# Patient Record
Sex: MALE | Race: WHITE | Employment: FULL TIME | URBAN - METROPOLITAN AREA
[De-identification: names, ages, dates, MRNs, and addresses within clinical notes are randomized per-mention and may not be internally consistent; named-entity substitution may affect disease eponyms.]

---

## 2017-12-27 ENCOUNTER — TRANSFERRED RECORDS (OUTPATIENT)
Dept: HEALTH INFORMATION MANAGEMENT | Facility: CLINIC | Age: 52
End: 2017-12-27

## 2018-01-08 ENCOUNTER — TRANSFERRED RECORDS (OUTPATIENT)
Dept: HEALTH INFORMATION MANAGEMENT | Facility: CLINIC | Age: 53
End: 2018-01-08

## 2018-01-30 ENCOUNTER — PRE VISIT (OUTPATIENT)
Dept: UROLOGY | Facility: CLINIC | Age: 53
End: 2018-01-30

## 2018-01-30 NOTE — TELEPHONE ENCOUNTER
PREVISIT INFORMATION                                                    Gildardo Biswaspippa scheduled for future visit at [facility] specialty clinics.    Patient is scheduled to see Dr. Arora on 2/5/18  Reason for visit: Thomas   Referring provider: Valeria  Has patient seen previous specialist? Yes.  Name of provider Dr. Shaw, [facility].   Medical Records:  Requested by CANELO Dunn on 1/30/18.    REVIEW                                                      New patient packet mailed to patient: Yes  Medication reconciliation complete: No      PLAN/FOLLOW-UP NEEDED                                                      Patient Reminders Given:    Informed patient to bring an updated list of allergies, medications, pharmacy details and insurance information. Directed patient to come to the 2nd floor, check-in D for their appointment. Informed patient to call back if appointment needs to be cancelled or rescheduled at [phone].    Reminded patient to bring any outside records regarding this appointment or have them faxed to clinic at [phone].    Reminded patient to complete and bring in urology questionnaire. Also for patient to please come with a full bladder and to ask , if early to get staff member for sample.

## 2018-02-05 ENCOUNTER — OFFICE VISIT (OUTPATIENT)
Dept: UROLOGY | Facility: CLINIC | Age: 53
End: 2018-02-05
Payer: COMMERCIAL

## 2018-02-05 VITALS
HEIGHT: 72 IN | HEART RATE: 81 BPM | BODY MASS INDEX: 36.84 KG/M2 | DIASTOLIC BLOOD PRESSURE: 87 MMHG | WEIGHT: 272 LBS | OXYGEN SATURATION: 94 % | SYSTOLIC BLOOD PRESSURE: 129 MMHG

## 2018-02-05 DIAGNOSIS — N52.1 ERECTILE DYSFUNCTION DUE TO DISEASES CLASSIFIED ELSEWHERE: Primary | ICD-10-CM

## 2018-02-05 DIAGNOSIS — N48.6 PEYRONIE'S DISEASE: ICD-10-CM

## 2018-02-05 PROCEDURE — 99203 OFFICE O/P NEW LOW 30 MIN: CPT | Performed by: UROLOGY

## 2018-02-05 RX ORDER — LISINOPRIL AND HYDROCHLOROTHIAZIDE 12.5; 2 MG/1; MG/1
1 TABLET ORAL
COMMUNITY

## 2018-02-05 RX ORDER — LORAZEPAM 0.5 MG/1
TABLET ORAL PRN
Refills: 1 | COMMUNITY
Start: 2018-01-08

## 2018-02-05 RX ORDER — ATORVASTATIN CALCIUM 10 MG/1
TABLET, FILM COATED ORAL
Refills: 0 | COMMUNITY
Start: 2018-01-09

## 2018-02-05 RX ORDER — CLONAZEPAM 0.5 MG/1
TABLET ORAL
Refills: 0 | COMMUNITY
Start: 2017-10-03

## 2018-02-05 RX ORDER — CLONAZEPAM 0.5 MG/1
0.5 TABLET ORAL
COMMUNITY

## 2018-02-05 RX ORDER — NEFAZODONE HYDROCHLORIDE 200 MG/1
100 TABLET ORAL
COMMUNITY

## 2018-02-05 RX ORDER — DOXAZOSIN 1 MG/1
TABLET ORAL
Refills: 0 | COMMUNITY
Start: 2018-01-08

## 2018-02-05 ASSESSMENT — PAIN SCALES - GENERAL: PAINLEVEL: NO PAIN (0)

## 2018-02-05 NOTE — NURSING NOTE
Gildardo Blankenship's goals for this visit include:   He requests these members of his care team be copied on today's visit information: PCP    PCP: Tristan Saha    Referring Provider:  Tristan Saha MD  [address] [address]    Chief Complaint   Patient presents with     Consult     Peryronie disease       Initial /87 (BP Location: Left arm, Patient Position: Sitting, Cuff Size: Adult Large)  Pulse 81  Ht 1.829 m (6')  Wt 123.4 kg (272 lb)  SpO2 94%  BMI 36.89 kg/m2 Estimated body mass index is 36.89 kg/(m^2) as calculated from the following:    Height as of this encounter: 1.829 m (6').    Weight as of this encounter: 123.4 kg (272 lb).  Medication Reconciliation: complete    Do you need any medication refills at today's visit? Jena Riojas LPN

## 2018-02-05 NOTE — MR AVS SNAPSHOT
After Visit Summary   2018    Gildardo Blankenship    MRN: [id number]           Patient Information     Date Of Birth          1965        Visit Information        Provider Department      2018 1:30 PM Gurdeep Arora MD [facility]        Today's Diagnoses     Erectile dysfunction due to diseases classified elsewhere    -  1    Peyronie's disease           Follow-ups after your visit        Follow-up notes from your care team     Return if symptoms worsen or fail to improve.      Who to contact     If you have questions or need follow up information about today's clinic visit or your schedule please contact [facility] directly at [phone].  Normal or non-critical lab and imaging results will be communicated to you by MyChart, letter or phone within 4 business days after the clinic has received the results. If you do not hear from us within 7 days, please contact the clinic through MyChart or phone. If you have a critical or abnormal lab result, we will notify you by phone as soon as possible.  Submit refill requests through zePASS or call your pharmacy and they will forward the refill request to us. Please allow 3 business days for your refill to be completed.          Additional Information About Your Visit        MyChart Information     zePASS is an electronic gateway that provides easy, online access to your medical records. With zePASS, you can request a clinic appointment, read your test results, renew a prescription or communicate with your care team.     To sign up for zePASS visit the website at [email or web address]   You will be asked to enter the access code listed below, as well as some personal information. Please follow the directions to create your username and password.     Your access code is: [email or web address]  Expires: 2018 12:57 AM     Your access code will  in 90 days. If you need help or a new code, please  contact your [facility] or call [phone] for assistance.        Care EveryWhere ID     This is your Care EveryWhere ID. This could be used by other organizations to access your [facility] medical records  [id number]        Your Vitals Were     Pulse Height Pulse Oximetry BMI (Body Mass Index)          81 1.829 m (6') 94% 36.89 kg/m2         Blood Pressure from Last 3 Encounters:   02/05/18 129/87    Weight from Last 3 Encounters:   02/05/18 123.4 kg (272 lb)              Today, you had the following     No orders found for display       Primary Care Provider Office Phone # Fax #    Tristan Saha [phone]       [address] [address]        Equal Access to Services     AMIRAH FREDERICK : Hadii alpesh berry hadasho Soomaali, waaxda luqadaha, qaybta kaalmada adeegyada, waxay idiin hayaan laci sewell . So [facility] [phone].    ATENCIÓN: Si habla español, tiene a hayes disposición servicios gratuitos de asistencia lingüística. Llame al [phone].    We comply with applicable federal civil rights laws and [place] laws. We do not discriminate on the basis of race, color, national origin, age, disability, sex, sexual orientation, or gender identity.            Thank you!     Thank you for choosing [facility]  for your care. Our goal is always to provide you with excellent care. Hearing back from our patients is one way we can continue to improve our services. Please take a few minutes to complete the written survey that you may receive in the mail after your visit with us. Thank you!             Your Updated Medication List - Protect others around you: Learn how to safely use, store and throw away your medicines at www.disposemymeds.org.          This list is accurate as of 2/5/18 11:59 PM.  Always use your most recent med list.                   Brand Name Dispense Instructions for use Diagnosis    atorvastatin 10 MG  tablet    LIPITOR     TK 1 T PO QD        * clonazePAM 0.5 MG tablet    klonoPIN     Take 0.5 mg by mouth        * clonazePAM 0.5 MG tablet    klonoPIN     TAKE 1 TABLET BY MOUTH TWICE A DAY AS NEEDED        doxazosin 1 MG tablet    CARDURA     TK 1  T PO QD        lisinopril-hydrochlorothiazide 20-12.5 MG per tablet    PRINZIDE/ZESTORETIC     Take 1 tablet by mouth        LORazepam 0.5 MG tablet    ATIVAN     as needed        nefazodone 200 MG tablet    SERZONE     Take 100 mg by mouth        omeprazole 20 MG CR capsule    priLOSEC     TK 1 C PO QD        * Notice:  This list has 2 medication(s) that are the same as other medications prescribed for you. Read the directions carefully, and ask your doctor or other care provider to review them with you.

## 2018-02-05 NOTE — LETTER
2/5/2018      RE: Gildardo Blankenship  [address]  [facility] [address]       I am seeing Gildardo Blankenship in consultation from Dr. Shaw for evaluation of Peyronie's disease and erectile dysfunction.    HPI:  Gildardo Blankenship is a 52 year old male with erectile dysfunction and Peyronie's disease.  He has a 90 degree dorsal curvature.  He noted this after an injury during intercourse about 2 years ago.  He felt a small pop, had a small area of dorsal bruising, and this turned into a 90  curve, eventually.    He has some difficulty with the girth and firmness of erections, he has a tri-mix on hand, but does not use this because it hurts, due to the stiffness against the curve.  He cannot really do insertive intercourse because of the curve.  He has noticed some loss of length, girth.  He's been followed by Dr. Shaw recently.    Retinitis pigmentosa so cannot take PDE5i.    REVIEW OF SYSTEMS:  General: negative  Skin: negative  Eyes: negative  Ears/Nose/Throat: negative  Respiratory: negative  Cardiovascular: negative  Gastrointestinal: negative  Genitourinary: see HPI  Musculoskeletal: negative  Neurologic: negative  Psychiatric: negative  Hematologic/Lymphatic/Immunologic: negative  Endocrine: negative    PAST MEDICAL HX:  Hypogonadism on testosterone replacement therapy  Retinitis pigmentosa  Kidney stones  Recovering alcoholic  Quit smoking  Fracture neck, back, arms ( former snocross racer).  Weight gain  Arthritis  No coronary artery disease  HLD  HTN  GERD  Kidney stone     PAST SURG HX:  Ortho surgeries  Kidney stone x 3.      FAMILY HX:  No family history on file.    SOCIAL HX:  Social History   Substance Use Topics     Smoking status: Former Smoker     Years: 29.00     Types: Cigarettes     Quit date: 6/15/2015     Smokeless tobacco: Never Used     Alcohol use Not on file       MEDICATIONS:  Current Outpatient Prescriptions   Medication Sig     clonazePAM (KLONOPIN) 0.5 MG tablet Take 0.5  mg by mouth     lisinopril-hydrochlorothiazide (PRINZIDE/ZESTORETIC) 20-12.5 MG per tablet Take 1 tablet by mouth     doxazosin (CARDURA) 1 MG tablet TK 1  T PO QD     atorvastatin (LIPITOR) 10 MG tablet TK 1 T PO QD     nefazodone (SERZONE) 200 MG tablet Take 100 mg by mouth     omeprazole (PRILOSEC) 20 MG CR capsule TK 1 C PO QD     clonazePAM (KLONOPIN) 0.5 MG tablet TAKE 1 TABLET BY MOUTH TWICE A DAY AS NEEDED     LORazepam (ATIVAN) 0.5 MG tablet as needed     No current facility-administered medications for this visit.        ALLERGIES:  Review of patient's allergies indicates no known allergies.      GENERAL PHYSICAL EXAM:     /87 (BP Location: Left arm, Patient Position: Sitting, Cuff Size: Adult Large)  Pulse 81  Ht 1.829 m (6')  Wt 123.4 kg (272 lb)  SpO2 94%  BMI 36.89 kg/m2   Constitutional: No acute distress. Well nourished.   PSYCH: normal mood and affect.  NEURO: normal gait, no focal deficits.   EYES: anicteric, EOMI, PERR.  CARDIOPULMONARY: breathing non-labored, pulse regular rate/rhythm, no peripheral edema.  GI: Abdomen soft, overweight.  MUSCULOSKELETAL: normal limb proportions, no muscle wasting, no contractures.  SKIN: Normal virilized hair distribution, no lesions, warts or rashes over genitalia, abdomen extremities or face.  HEME/LYMPH: no ecchymosis, no lymphadenopathy in groin, no lymphedema.     EXAM:  Phallus circumcised, meatus adequate, diffuse plaques palpated, especially distal bilateral corpora.  Left testis descended , size is normal  , consistency is normal . No intra-testicular masses.   Right testis descended , size is normal  , consistency is normal . No intra-testicular masses.   Epididymes present, non-tender, not-enlarged.   Cord structures not remarkable.     Imaging/labs:  None    ASSESSMENT:     Peyronie's disease with severe curve, and loss of girth, length.    Erectile dysfunction- firmness is not what it was but is probably adequate for intercourse if penis  was straight.    Hypogonadism, taking testosterone.    PLAN:  Orders Placed This Encounter     clonazePAM (KLONOPIN) 0.5 MG tablet     lisinopril-hydrochlorothiazide (PRINZIDE/ZESTORETIC) 20-12.5 MG per tablet     doxazosin (CARDURA) 1 MG tablet     atorvastatin (LIPITOR) 10 MG tablet     nefazodone (SERZONE) 200 MG tablet     omeprazole (PRILOSEC) 20 MG CR capsule     clonazePAM (KLONOPIN) 0.5 MG tablet     LORazepam (ATIVAN) 0.5 MG tablet         The risks of plaque excision and grafting were discussed (numbness, continued curvature, de shelton erectile dysfunction, pain, infection, possible plication also required).  Main benefit is possible restored length and perhaps girth.  Discussed that still it would be shorter than before the injury.     The risks of plication surgery were explained. I stressed to him that shortening can occur, but we can make him functionally straight rather than perfectly straight to avoid excessive shortening.  Discussed that de shelton ED is about 5% risk with plication.  Other risks include recurrence of curvature, pain from sutures, palpable sutures.     Overall, I would lean towards initially just a plaque excision and grafting for him.  It is not clear that he would need a penile prosthesis, and he and I would like to avoid this if possible.  We discussed that this is how I would proceed for him.  I encouraged him to get a second opinions and think about what he'd like to do.  Discussed PEG carries more risk of ED than plication, but might help restore some degree of length he's lost.      Copied cc to Consulting provider Valeria    Thank-you for the kind consultation.  Gurdeep Arora MD     Urological Surgeon    Gurdeep Arora MD

## 2018-02-05 NOTE — PROGRESS NOTES
I am seeing Gildardo Blankenship in consultation from Dr. Shaw for evaluation of Peyronie's disease and erectile dysfunction.    HPI:  Gildardo Blankenship is a 52 year old male with erectile dysfunction and Peyronie's disease.  He has a 90 degree dorsal curvature.  He noted this after an injury during intercourse about 2 years ago.  He felt a small pop, had a small area of dorsal bruising, and this turned into a 90  curve, eventually.    He has some difficulty with the girth and firmness of erections, he has a tri-mix on hand, but does not use this because it hurts, due to the stiffness against the curve.  He cannot really do insertive intercourse because of the curve.  He has noticed some loss of length, girth.  He's been followed by Dr. Shaw recently.    Retinitis pigmentosa so cannot take PDE5i.    REVIEW OF SYSTEMS:  General: negative  Skin: negative  Eyes: negative  Ears/Nose/Throat: negative  Respiratory: negative  Cardiovascular: negative  Gastrointestinal: negative  Genitourinary: see HPI  Musculoskeletal: negative  Neurologic: negative  Psychiatric: negative  Hematologic/Lymphatic/Immunologic: negative  Endocrine: negative    PAST MEDICAL HX:  Hypogonadism on testosterone replacement therapy  Retinitis pigmentosa  Kidney stones  Recovering alcoholic  Quit smoking  Fracture neck, back, arms ( former snocross racer).  Weight gain  Arthritis  No coronary artery disease  HLD  HTN  GERD  Kidney stone     PAST SURG HX:  Ortho surgeries  Kidney stone x 3.      FAMILY HX:  No family history on file.    SOCIAL HX:  Social History   Substance Use Topics     Smoking status: Former Smoker     Years: 29.00     Types: Cigarettes     Quit date: 6/15/2015     Smokeless tobacco: Never Used     Alcohol use Not on file       MEDICATIONS:  Current Outpatient Prescriptions   Medication Sig     clonazePAM (KLONOPIN) 0.5 MG tablet Take 0.5 mg by mouth     lisinopril-hydrochlorothiazide (PRINZIDE/ZESTORETIC) 20-12.5  MG per tablet Take 1 tablet by mouth     doxazosin (CARDURA) 1 MG tablet TK 1  T PO QD     atorvastatin (LIPITOR) 10 MG tablet TK 1 T PO QD     nefazodone (SERZONE) 200 MG tablet Take 100 mg by mouth     omeprazole (PRILOSEC) 20 MG CR capsule TK 1 C PO QD     clonazePAM (KLONOPIN) 0.5 MG tablet TAKE 1 TABLET BY MOUTH TWICE A DAY AS NEEDED     LORazepam (ATIVAN) 0.5 MG tablet as needed     No current facility-administered medications for this visit.        ALLERGIES:  Review of patient's allergies indicates no known allergies.      GENERAL PHYSICAL EXAM:     /87 (BP Location: Left arm, Patient Position: Sitting, Cuff Size: Adult Large)  Pulse 81  Ht 1.829 m (6')  Wt 123.4 kg (272 lb)  SpO2 94%  BMI 36.89 kg/m2   Constitutional: No acute distress. Well nourished.   PSYCH: normal mood and affect.  NEURO: normal gait, no focal deficits.   EYES: anicteric, EOMI, PERR.  CARDIOPULMONARY: breathing non-labored, pulse regular rate/rhythm, no peripheral edema.  GI: Abdomen soft, overweight.  MUSCULOSKELETAL: normal limb proportions, no muscle wasting, no contractures.  SKIN: Normal virilized hair distribution, no lesions, warts or rashes over genitalia, abdomen extremities or face.  HEME/LYMPH: no ecchymosis, no lymphadenopathy in groin, no lymphedema.     EXAM:  Phallus circumcised, meatus adequate, diffuse plaques palpated, especially distal bilateral corpora.  Left testis descended , size is normal  , consistency is normal . No intra-testicular masses.   Right testis descended , size is normal  , consistency is normal . No intra-testicular masses.   Epididymes present, non-tender, not-enlarged.   Cord structures not remarkable.     Imaging/labs:  None    ASSESSMENT:     Peyronie's disease with severe curve, and loss of girth, length.    Erectile dysfunction- firmness is not what it was but is probably adequate for intercourse if penis was straight.    Hypogonadism, taking testosterone.    PLAN:  Orders Placed  This Encounter     clonazePAM (KLONOPIN) 0.5 MG tablet     lisinopril-hydrochlorothiazide (PRINZIDE/ZESTORETIC) 20-12.5 MG per tablet     doxazosin (CARDURA) 1 MG tablet     atorvastatin (LIPITOR) 10 MG tablet     nefazodone (SERZONE) 200 MG tablet     omeprazole (PRILOSEC) 20 MG CR capsule     clonazePAM (KLONOPIN) 0.5 MG tablet     LORazepam (ATIVAN) 0.5 MG tablet         The risks of plaque excision and grafting were discussed (numbness, continued curvature, de shelton erectile dysfunction, pain, infection, possible plication also required).  Main benefit is possible restored length and perhaps girth.  Discussed that still it would be shorter than before the injury.     The risks of plication surgery were explained. I stressed to him that shortening can occur, but we can make him functionally straight rather than perfectly straight to avoid excessive shortening.  Discussed that de shelton ED is about 5% risk with plication.  Other risks include recurrence of curvature, pain from sutures, palpable sutures.     Overall, I would lean towards initially just a plaque excision and grafting for him.  It is not clear that he would need a penile prosthesis, and he and I would like to avoid this if possible.  We discussed that this is how I would proceed for him.  I encouraged him to get a second opinions and think about what he'd like to do.  Discussed PEG carries more risk of ED than plication, but might help restore some degree of length he's lost.      Copied cc to Consulting provider Valeria    Thank-you for the kind consultation.  Gurdeep Arora MD     Urological Surgeon

## 2018-02-06 PROBLEM — N52.1 ERECTILE DYSFUNCTION DUE TO DISEASES CLASSIFIED ELSEWHERE: Status: ACTIVE | Noted: 2018-02-06

## 2018-02-06 PROBLEM — N48.6 PEYRONIE'S DISEASE: Status: ACTIVE | Noted: 2018-02-06

## 2023-10-04 PROCEDURE — 88342 IMHCHEM/IMCYTCHM 1ST ANTB: CPT | Mod: 26

## 2023-10-04 PROCEDURE — 88305 TISSUE EXAM BY PATHOLOGIST: CPT | Mod: TC,ORL | Performed by: INTERNAL MEDICINE

## 2023-10-04 PROCEDURE — 88305 TISSUE EXAM BY PATHOLOGIST: CPT | Mod: 26

## 2023-10-05 ENCOUNTER — LAB REQUISITION (OUTPATIENT)
Dept: LAB | Facility: CLINIC | Age: 58
End: 2023-10-05
Payer: COMMERCIAL

## 2023-10-05 DIAGNOSIS — K22.4 DYSKINESIA OF ESOPHAGUS: ICD-10-CM

## 2023-10-05 DIAGNOSIS — K31.89 OTHER DISEASES OF STOMACH AND DUODENUM: ICD-10-CM

## 2023-10-05 DIAGNOSIS — K30 FUNCTIONAL DYSPEPSIA: ICD-10-CM

## 2023-10-05 DIAGNOSIS — R10.13 EPIGASTRIC PAIN: ICD-10-CM

## 2023-10-09 LAB
PATH REPORT.ADDENDUM SPEC: NORMAL
PATH REPORT.COMMENTS IMP SPEC: NORMAL
PATH REPORT.COMMENTS IMP SPEC: NORMAL
PATH REPORT.FINAL DX SPEC: NORMAL
PATH REPORT.GROSS SPEC: NORMAL
PATH REPORT.MICROSCOPIC SPEC OTHER STN: NORMAL
PATH REPORT.RELEVANT HX SPEC: NORMAL
PHOTO IMAGE: NORMAL